# Patient Record
Sex: FEMALE | NOT HISPANIC OR LATINO | URBAN - METROPOLITAN AREA
[De-identification: names, ages, dates, MRNs, and addresses within clinical notes are randomized per-mention and may not be internally consistent; named-entity substitution may affect disease eponyms.]

---

## 2019-06-25 ENCOUNTER — OFFICE VISIT (OUTPATIENT)
Dept: URBAN - METROPOLITAN AREA CLINIC 18 | Facility: CLINIC | Age: 29
End: 2019-06-25
Payer: COMMERCIAL

## 2019-06-25 DIAGNOSIS — T37.2X5A ADVERSE EFFECT OF ANTIMALARIAL, INITIAL ENCOUNTER: Chronic | ICD-10-CM

## 2019-06-25 DIAGNOSIS — Z79.899 OTHER LONG TERM (CURRENT) DRUG THERAPY: Chronic | ICD-10-CM

## 2019-06-25 DIAGNOSIS — H04.123 DRY EYE SYNDROME OF BILATERAL LACRIMAL GLANDS: Chronic | ICD-10-CM

## 2019-06-25 PROCEDURE — 92004 COMPRE OPH EXAM NEW PT 1/>: CPT | Performed by: OPTOMETRIST

## 2019-06-25 ASSESSMENT — KERATOMETRY
OS: 43.38
OD: 43.88

## 2019-06-25 ASSESSMENT — INTRAOCULAR PRESSURE
OD: 14
OS: 11

## 2019-06-25 NOTE — IMPRESSION/PLAN
Impression: Dry eye syndrome of bilateral lacrimal glands: H04.123. Plan: Dry eyes account for the patient's complaints. There is no evidence of permanent changes to the cornea. Explained that this condition does not have a cure. The options explained to the patient include topical lubricants, punctal plugs, and laser punctal occlusion.

## 2019-06-25 NOTE — IMPRESSION/PLAN
Impression: Lupus erythematosus: L93.0. Plan: Patient is currently being overdosed at 200 mg PO BID; This exceeds the maximum recommended safe dosing of no more than 5 mg/kg/day. Discussed change in dosing with patient and recommended she call rheumatologist to adjust dosing. Ordered HD 5 line mac OCT in black and white at [address] [facility] with 10-2 VF OU. Medication clearance pending VF and OCT results OU.

## 2019-08-06 ENCOUNTER — TESTING ONLY (OUTPATIENT)
Dept: URBAN - METROPOLITAN AREA CLINIC 17 | Facility: CLINIC | Age: 29
End: 2019-08-06
Payer: COMMERCIAL

## 2019-08-06 DIAGNOSIS — L93.0 DISCOID LUPUS ERYTHEMATOSUS: Primary | ICD-10-CM

## 2019-08-06 PROCEDURE — 92083 EXTENDED VISUAL FIELD XM: CPT | Performed by: OPTOMETRIST

## 2019-08-06 PROCEDURE — 92134 CPTRZ OPH DX IMG PST SGM RTA: CPT | Performed by: OPTOMETRIST
